# Patient Record
Sex: FEMALE | Race: BLACK OR AFRICAN AMERICAN | NOT HISPANIC OR LATINO | Employment: FULL TIME | URBAN - METROPOLITAN AREA
[De-identification: names, ages, dates, MRNs, and addresses within clinical notes are randomized per-mention and may not be internally consistent; named-entity substitution may affect disease eponyms.]

---

## 2024-05-09 ENCOUNTER — TELEPHONE (OUTPATIENT)
Dept: URGENT CARE | Facility: CLINIC | Age: 29
End: 2024-05-09

## 2024-05-09 ENCOUNTER — OFFICE VISIT (OUTPATIENT)
Dept: URGENT CARE | Facility: CLINIC | Age: 29
End: 2024-05-09

## 2024-05-09 VITALS
WEIGHT: 160 LBS | DIASTOLIC BLOOD PRESSURE: 86 MMHG | SYSTOLIC BLOOD PRESSURE: 122 MMHG | OXYGEN SATURATION: 98 % | RESPIRATION RATE: 20 BRPM | TEMPERATURE: 98 F | HEART RATE: 67 BPM | HEIGHT: 63 IN | BODY MASS INDEX: 28.35 KG/M2

## 2024-05-09 DIAGNOSIS — S60.10XA SUBUNGUAL HEMATOMA OF FINGERNAIL, INITIAL ENCOUNTER: ICD-10-CM

## 2024-05-09 DIAGNOSIS — M79.644 PAIN IN FINGER OF RIGHT HAND: ICD-10-CM

## 2024-05-09 DIAGNOSIS — S60.121A CONTUSION OF RIGHT INDEX FINGER WITH DAMAGE TO NAIL, INITIAL ENCOUNTER: ICD-10-CM

## 2024-05-09 DIAGNOSIS — S67.190A CRUSHING INJURY OF RIGHT INDEX FINGER, INITIAL ENCOUNTER: Primary | ICD-10-CM

## 2024-05-09 PROCEDURE — 73140 X-RAY EXAM OF FINGER(S): CPT | Mod: TIER,RT,S$GLB, | Performed by: RADIOLOGY

## 2024-05-09 PROCEDURE — 99204 OFFICE O/P NEW MOD 45 MIN: CPT | Mod: S$GLB,,, | Performed by: NURSE PRACTITIONER

## 2024-05-09 RX ORDER — MUPIROCIN 20 MG/G
OINTMENT TOPICAL 3 TIMES DAILY PRN
Qty: 22 G | Refills: 0 | Status: SHIPPED | OUTPATIENT
Start: 2024-05-09

## 2024-05-09 NOTE — PROGRESS NOTES
X-Ray Finger 2 or More Views Right    Result Date: 5/9/2024  EXAMINATION: XR FINGER 2 OR MORE VIEWS RIGHT CLINICAL HISTORY: Crushing injury of right index finger, initial encounter TECHNIQUE: XR FINGER 2 OR MORE VIEWS RIGHT COMPARISON: None FINDINGS: No bone, joint, or soft tissue abnormality is seen.     See above Electronically signed by: Jaya Valdes Jr Date:    05/09/2024 Time:    11:54      Patient notified of xray result

## 2024-05-09 NOTE — TELEPHONE ENCOUNTER
patient called, notified of xray result, questions encouraged and answered     X-Ray Finger 2 or More Views Right    Result Date: 5/9/2024  EXAMINATION: XR FINGER 2 OR MORE VIEWS RIGHT CLINICAL HISTORY: Crushing injury of right index finger, initial encounter TECHNIQUE: XR FINGER 2 OR MORE VIEWS RIGHT COMPARISON: None FINDINGS: No bone, joint, or soft tissue abnormality is seen.     See above Electronically signed by: Jaya Valdes Jr Date:    05/09/2024 Time:    11:54

## 2024-05-09 NOTE — PATIENT INSTRUCTIONS
Rest and avoid activities that make your problem worse.  Place an ice pack or a bag of frozen peas wrapped in a towel over your finger. Never put ice directly on the skin. Do not leave the ice on more than 10 to 15 minutes at a time.  Prop your hand up on pillows when possible to lessen swelling.  Wear a splint to keep the finger from moving or for support if your doctor suggests you do so.  Tape fingers together to limit movement if your doctor suggests you to do so.  Lightly wrap the finger in gauze or a finger bandage to help lessen swelling.

## 2024-05-09 NOTE — PROCEDURES
Procedures     Trephination of a subungual hematoma right index finger, small amount of blood obtained using high temperature cautery fine tip bovie

## 2024-05-09 NOTE — PROGRESS NOTES
"Subjective:      Patient ID: Destin Higgins is a 28 y.o. female.    Vitals:  height is 5' 3" (1.6 m) and weight is 72.6 kg (160 lb). Her oral temperature is 98 °F (36.7 °C). Her blood pressure is 122/86 and her pulse is 67. Her respiration is 20 and oxygen saturation is 98%.     Chief Complaint: Hand Pain    This is a 28 y.o. female who presents today with a chief complaint of right index finger. Happened yesterday in the door of truck. Pt states that she has iced the finger to help the pain, and has taken Excedrin twice to help with pain of 7/10.    28 year old female presents to clinic with complaints of right index finger pain after slamming the finger in a car door on yesterday. Reports pain 7 on a scale 0-10 to the top part of her index finger and nailbed treating with Excedrin without resolve. Does not recall last tetanus vaccine    Hand Pain   The incident occurred 12 to 24 hours ago. The incident occurred at home. The injury mechanism was a direct blow. The pain is present in the right fingers (Right index finger). The quality of the pain is described as shooting and burning (throbbing, tip of finger is constantly buring and red). The pain does not radiate. The pain is at a severity of 7/10. The pain is moderate. The pain has been Constant since the incident. Associated symptoms include tingling. Pertinent negatives include no chest pain, muscle weakness or numbness. The symptoms are aggravated by movement and palpation. She has tried ice, elevation and rest (excedin) for the symptoms. The treatment provided no relief.       Cardiovascular:  Negative for chest pain.   Musculoskeletal:  Positive for pain, trauma, joint pain, joint swelling, abnormal ROM of joint and muscle ache.   Skin:  Positive for color change and bruising. Negative for erythema.   Neurological:  Negative for numbness.      Objective:     Physical Exam   Constitutional: She is oriented to person, place, and time. She appears well-developed. "   HENT:   Head: Normocephalic and atraumatic. Head is without abrasion, without contusion and without laceration.   Ears:   Right Ear: External ear normal.   Left Ear: External ear normal.   Nose: Nose normal.   Mouth/Throat: Oropharynx is clear and moist and mucous membranes are normal.   Eyes: EOM and lids are normal. Extraocular movement intact   Neck: Trachea normal and phonation normal. Neck supple.   Cardiovascular: Normal rate.   Pulmonary/Chest: Effort normal. No stridor. No respiratory distress.   Musculoskeletal:      Right hand: Right index finger: Exhibits ecchymosis, swelling and tenderness. There is tenderness of the DIP joint. There is a nailbed injury of the following fingers: index. There is a subungal hematoma of the following fingers: index.   Neurological: She is alert and oriented to person, place, and time.   Skin: Skin is warm, dry, intact and no rash. Capillary refill takes less than 2 seconds. No abrasion, No burn, No bruising, No erythema and No ecchymosis   Psychiatric: Her speech is normal and behavior is normal. Judgment and thought content normal.   Nursing note and vitals reviewed.      Assessment:     1. Crushing injury of right index finger, initial encounter    2. Contusion of right index finger with damage to nail, initial encounter    3. Pain in finger of right hand    4. Subungual hematoma of fingernail, initial encounter        Plan:       Crushing injury of right index finger, initial encounter  -     X-Ray Finger 2 or More Views Right; Future; Expected date: 05/09/2024    Contusion of right index finger with damage to nail, initial encounter  -     X-Ray Finger 2 or More Views Right; Future; Expected date: 05/09/2024    Pain in finger of right hand  -     X-Ray Finger 2 or More Views Right; Future; Expected date: 05/09/2024    Subungual hematoma of fingernail, initial encounter  -     X-Ray Finger 2 or More Views Right; Future; Expected date: 05/09/2024  -     mupirocin  (BACTROBAN) 2 % ointment; Apply topically 3 (three) times daily as needed.  Dispense: 22 g; Refill: 0      Procedures     Trephination of a subungual hematoma right index finger, small amount of blood obtained using high temperature cautery fine tip bovie     Patient Instructions   Rest and avoid activities that make your problem worse.  Place an ice pack or a bag of frozen peas wrapped in a towel over your finger. Never put ice directly on the skin. Do not leave the ice on more than 10 to 15 minutes at a time.  Prop your hand up on pillows when possible to lessen swelling.  Wear a splint to keep the finger from moving or for support if your doctor suggests you do so.  Tape fingers together to limit movement if your doctor suggests you to do so.  Lightly wrap the finger in gauze or a finger bandage to help lessen swelling.       Declined tetanus vaccine at this time, plans to get upon return home in [place]